# Patient Record
Sex: FEMALE | Race: WHITE | NOT HISPANIC OR LATINO
[De-identification: names, ages, dates, MRNs, and addresses within clinical notes are randomized per-mention and may not be internally consistent; named-entity substitution may affect disease eponyms.]

---

## 2022-09-01 PROBLEM — Z00.00 ENCOUNTER FOR PREVENTIVE HEALTH EXAMINATION: Status: ACTIVE | Noted: 2022-09-01

## 2022-09-21 ENCOUNTER — APPOINTMENT (OUTPATIENT)
Dept: VASCULAR SURGERY | Facility: CLINIC | Age: 82
End: 2022-09-21

## 2022-09-21 ENCOUNTER — NON-APPOINTMENT (OUTPATIENT)
Age: 82
End: 2022-09-21

## 2022-09-21 VITALS
DIASTOLIC BLOOD PRESSURE: 61 MMHG | HEART RATE: 71 BPM | WEIGHT: 132 LBS | SYSTOLIC BLOOD PRESSURE: 121 MMHG | BODY MASS INDEX: 21.99 KG/M2 | HEIGHT: 65 IN

## 2022-09-21 DIAGNOSIS — E78.5 HYPERLIPIDEMIA, UNSPECIFIED: ICD-10-CM

## 2022-09-21 DIAGNOSIS — I83.90 ASYMPTOMATIC VARICOSE VEINS OF UNSPECIFIED LOWER EXTREMITY: ICD-10-CM

## 2022-09-21 DIAGNOSIS — I78.1 NEVUS, NON-NEOPLASTIC: ICD-10-CM

## 2022-09-21 DIAGNOSIS — Z78.9 OTHER SPECIFIED HEALTH STATUS: ICD-10-CM

## 2022-09-21 PROCEDURE — 99203 OFFICE O/P NEW LOW 30 MIN: CPT

## 2022-09-21 PROCEDURE — 93970 EXTREMITY STUDY: CPT

## 2022-09-21 RX ORDER — PNV NO.95/FERROUS FUM/FOLIC AC 28MG-0.8MG
TABLET ORAL
Refills: 0 | Status: ACTIVE | COMMUNITY

## 2022-09-21 RX ORDER — MULTIVIT-MIN/FOLIC/VIT K/LYCOP 400-300MCG
1000 TABLET ORAL
Refills: 0 | Status: ACTIVE | COMMUNITY

## 2022-09-21 RX ORDER — UBIDECARENONE/VIT E ACET 100MG-5
CAPSULE ORAL
Refills: 0 | Status: ACTIVE | COMMUNITY

## 2022-09-21 RX ORDER — CALCIUM CARBONATE/VITAMIN D3 600 MG-10
TABLET ORAL
Refills: 0 | Status: ACTIVE | COMMUNITY

## 2022-09-21 RX ORDER — ROSUVASTATIN CALCIUM 10 MG/1
10 TABLET, FILM COATED ORAL
Refills: 0 | Status: ACTIVE | COMMUNITY

## 2022-09-26 NOTE — ADDENDUM
[FreeTextEntry1] : I, Dr. Erlin Ghotra, personally performed the evaluation and management (E/M) services for this new patient.  That E/M includes conducting the initial examination, assessing all conditions, and establishing the plan of care.  Today, my ACP, Mita Mcnair NP, was here to observe my evaluation and management services for this patient to be followed going forward. \par \par The documentation for this encounter was entered by Yani Garcia acting as a scribe for Dr.Gary Ghotra.\par

## 2022-09-26 NOTE — CONSULT LETTER
[Dear  ___] : Dear  [unfilled], [FreeTextEntry2] : Malcolm Bear MD\par [address]\par [place] [id number] [FreeTextEntry1] : I saw Ms Jami Segundo for an evaluation of varicose veins. She reports long standing history of varicose and spider veins. She has had sclerotherapy in the past and possibly also vein phlebectomy.  The veins on the left leg have become larger and bothersome. Denies any swelling, ulcers or history of bleeding or clotting disorders.\par \par On exam she has large bulging veins down the left calf to dorsum of the left foot with positive Trendelenburg's sign indicative of greater saphenous vein reflux. Scattered spider veins on both thighs and calves, worse on left calf, ankle and foot.  There is good arterial perfusion to both feet.\par \par Venous duplex showed no evidence of thrombosis or reflux in the right greater saphenous vein.  The scan confirms that the left greater saphenous vein has reflux into the large bulging venous varices.  \par \par I recommend proceeding with ablation of the incompetent left greater saphenous vein.  We discussed that stab phlebectomy may be a possibility following this if the varices persist but most patients do not require it.  We will perform this under local anesthesia in the office and she will be immediately able to walk out with only Ace bandages on the leg.  She wants to proceed and we will arrange for it to be done in our office. \par \par My complete EMR office note is below for your records.  [FreeTextEntry3] : Sincerely, \par \par Erlin Ghotra M.D. \par , Surgical Services [facility]\par , Department of Surgery [facility]\par Professor of Surgery, [employer] at [facility]

## 2022-09-26 NOTE — ASSESSMENT
[FreeTextEntry1] : 81 y/o F w/ LLE GSV reflux, large bulging vv, and spider veins. On exam, large bulging veins down the left calf to dorsum of the left foot. +Trendeleburg's sign. Scattered spider veins on both thighs and calves, worse on left calf, ankle and foot. Venous duplex showed no evidence of DVT/SVT bilaterally. RLE GSV had no reflux. LLE GSV positive reflux and vv noted, measuring up to 7.0mm. \par \par Discussed with pt the veins are not life threatening. I recommend ablation of the L incompetent greater saphenous vein.She might need subsequent stab phlebectomy if the varices persist. She wants to proceed and we will arrange for it to be done in our office. Risk, complications and alternatives were discussed, all questions were answered.

## 2022-09-26 NOTE — HISTORY OF PRESENT ILLNESS
[FreeTextEntry1] : 83 y/o F referred by PT Jenni Damian (who is also another pt of ours). She has a PMHx of HLD, varicose veins and remote sclerotherapy and [place] She presents for varicose vein evaluation. She describes large bulging varicose veins down her L calf to the top of her foot. She explains over time the veins have become larger and larger. They are not painful or bothersome but do itch at times. She finds them also visually disturbing. She would like to discuss possible treatment to remove them. Denies any leg swelling, back pain, rest pain, leg trauma, or skin changes. SHe also points to spider veins throughout both legs.\par \par \par Pt lives in [place]. \par \par SHx:\par -  for family in [place], kids all grown and now works with the mother only. \par - Stays active \par - Never smoker\par \par FHx:\par -Father: hx of vv

## 2022-09-26 NOTE — PROCEDURE
[FreeTextEntry1] : Venous duplex ordered to eval veins and r/o reflux, shows: negative DVT/SVTbilaterally. RLE: No reflux. LLE: GSV reflux and vv noted, measuring up to 7.0mm.

## 2022-09-26 NOTE — PHYSICAL EXAM
[Normal Rate and Rhythm] : normal rate and rhythm [2+] : left 2+ [JVD] : no jugular venous distention  [Ankle Swelling (On Exam)] : not present [] : not present [de-identified] : WN/WD [de-identified] : NC/AT [de-identified] : Supple [FreeTextEntry1] : Large bulging veins down the left calf to dorsum of the left foot. +Trendeleburg's sign. Scattered spider veins on both thighs and calves, worse on left calf, ankle and foot. [de-identified] : FROM

## 2022-10-07 ENCOUNTER — APPOINTMENT (OUTPATIENT)
Dept: VASCULAR SURGERY | Facility: CLINIC | Age: 82
End: 2022-10-07

## 2022-10-07 DIAGNOSIS — I87.2 VENOUS INSUFFICIENCY (CHRONIC) (PERIPHERAL): ICD-10-CM

## 2022-10-07 PROCEDURE — 36475 ENDOVENOUS RF 1ST VEIN: CPT | Mod: LT

## 2022-10-07 NOTE — ADDENDUM
[FreeTextEntry1] : This note was written by Mita Mcnair NP acting as scribe for Dr.Gary Brandin ALTMAN\par \par I, Dr. Erlin Ghotra  have read and attest that all the information, medical decision making and discharge instructions within are true and accurate.\par

## 2022-10-07 NOTE — PROCEDURE
[FreeTextEntry3] : Surgeon: Erlin Ghotra MD\par \par Assistant: Mita You RVT\par \par Pre-Op Diagnosis:  Incompetent LEFT Greater Saphenous Vein\par \par Post-Op Diagnosis:  Same\par \par Procedure:  Transcatheter Occlusion of LEFT Greater Saphenous Vein using a Radio- Frequency Thermal Ablation (VNUS) ClosureFAST Catheter.\par \par Anesthesia: Local \par \par History: Symptomatic varicose veins\par \par Findings:  Complete occlusion of greater saphenous vein at end of procedure.\par \par Procedure: The patient’s leg was prepped and draped.  Under local 1% Lidocaine the greater saphenous vein was punctured below the knee with a micropuncture needle and then the guidewire was inserted into the vein.  This was performed under ultrasound guidance.  The skin was incised with a #11 Blade, a dilator was inserted and then the 7 Fr. Introducer was placed into the greater saphenous vein.  \par \par The fossa ovalis was visualized with the Duplex scan.  The common femoral vein was confirmed to be patent.  Duplex examination of the greater saphenous vein from the calf to the groin was performed.  The greater saphenous and inferior epigastric vein were identified and the VNUS catheter was introduced through the introducer and into the vein up to the fossa ovalis.  It was positioned 3 cm distal to the inferior epigastric vein.\par \par Tumescent solution (400 cc normal saline, 4cc of 8.4% Sodium bicarbonate and 40 cc 1% Lidoaine) was infiltrated subfascially over the vein.  \par \par The VNUS ClosureFAST catheter checked for proper function.  Thermal ablation was begun after the position of the catheter was once again confirmed to be 3 cm distal to the inferior epigastric branch of the greater saphenous vein. The proximal 7 cm was treated twice and then the rest of the vein was treated with single 20 sec cycles. The sheath and catheter were removed. Compression was applied for hemostasis.    \par \par Confirmation of common femoral vein patency and occlusion of the greater saphenous vein was made with duplex ultrasonography.  \par \par The leg was wrapped with gauze and Ace Bandages.  The patient remained on the table until alert and was then comfortable ambulating.           \par \par Patient will return in 2-3 days for repeat ultrasound to make sure GSV is closed and to check for heat induced thrombus.\par

## 2022-10-11 ENCOUNTER — APPOINTMENT (OUTPATIENT)
Dept: VASCULAR SURGERY | Facility: CLINIC | Age: 82
End: 2022-10-11

## 2022-10-11 PROCEDURE — 93971 EXTREMITY STUDY: CPT

## 2022-11-09 ENCOUNTER — APPOINTMENT (OUTPATIENT)
Dept: VASCULAR SURGERY | Facility: CLINIC | Age: 82
End: 2022-11-09

## 2022-11-09 VITALS
HEIGHT: 65 IN | DIASTOLIC BLOOD PRESSURE: 57 MMHG | BODY MASS INDEX: 21.99 KG/M2 | SYSTOLIC BLOOD PRESSURE: 115 MMHG | WEIGHT: 132 LBS | HEART RATE: 56 BPM

## 2022-11-09 PROCEDURE — 99213 OFFICE O/P EST LOW 20 MIN: CPT

## 2022-11-09 PROCEDURE — 93971 EXTREMITY STUDY: CPT

## 2022-11-10 RX ORDER — FLUTICASONE PROPIONATE 50 UG/1
50 SPRAY, METERED NASAL
Qty: 16 | Refills: 0 | Status: ACTIVE | COMMUNITY
Start: 2021-10-25

## 2022-11-10 RX ORDER — COVID-19 ANTIGEN TEST
KIT MISCELLANEOUS
Qty: 8 | Refills: 0 | Status: ACTIVE | COMMUNITY
Start: 2022-07-05

## 2022-11-10 RX ORDER — CONJUGATED ESTROGENS 0.62 MG/G
0.62 CREAM VAGINAL
Qty: 30 | Refills: 0 | Status: ACTIVE | COMMUNITY
Start: 2022-11-02

## 2022-11-14 NOTE — HISTORY OF PRESENT ILLNESS
[FreeTextEntry1] : 83 y/o F referred by PT Jenni Damian (who is also another pt of ours). She has a PMHx of HLD, varicose veins and remote sclerotherapy and [place]le[place] ([place], [place]rs ago). She presented 9/21/22 with signs of LLE GSV reflux, and large, symptomatic, bulging vv, and spider veins. On 10/7/22 she underwent L GSV. \par \par Today, she presents for 1 month visit after L RFA. She is doing well. She reports some sensitivity over L leg, otherwise swelling has reduced. She still notes visually disturbing, and bothersome varicosities over her L foot and ankle and would like to discuss possible treatment. She points out scattered spider veins on the front of her L leg and bulging veins on the R calf that are only visually disturbing. Denies any wounds, signs of phlebitis, leg trauma, or skin changes. \par \par \par Pt lives in [place]. \par \par SHx:\par -  for family in [place], kids all grown and now works with the mother only. \par - Stays active \par - Never smoker\par \par FHx:\par -Father: hx of vv

## 2022-11-14 NOTE — PROCEDURE
[FreeTextEntry1] : LLE Venous US ordered today to eval GSV reveals:  negative DVT/SVT. L GSV closed s/p RFA 1 month ago. Varicosities noted at the L ankle. \par

## 2022-11-14 NOTE — ASSESSMENT
[Arterial/Venous Disease] : arterial/venous disease [FreeTextEntry1] : 81 y/o F w/ LLE GSV reflux s/p L RFA 10/7/22 with persistent, symptomatic LLE varicose veins, and bilt scattered spider veins. \par On exam, bulging vv worse on L distal calf, ankle and foot. Scattered spider veins on both calves. No edema or skin changes or wounds. \par LLE Venous US  to eval GSV reveals negative DVT/SVT. L GSV closed s/p RFA 1 month ago. Varicosities noted at the ankle. \par We discussed in office stab phlebectomy of L ankle and foot varicosities. Risk, complications, use of local anesthetic and alternatives were discussed, all questions were answered. Patient would like to proceed with procedure after the holidays. We will schedule this for her in the near future. Procedure will be completed in the office. \par

## 2022-11-14 NOTE — PHYSICAL EXAM
[Respiratory Effort] : normal respiratory effort [Normal Rate and Rhythm] : normal rate and rhythm [2+] : left 2+ [Varicose Veins Of Lower Extremities] : present [Varicose Veins Of The Left Leg] : of the left leg [No Rash or Lesion] : No rash or lesion [Alert] : alert [Oriented to Person] : oriented to person [Oriented to Place] : oriented to place [Oriented to Time] : oriented to time [Calm] : calm [JVD] : no jugular venous distention  [Ankle Swelling (On Exam)] : not present [Ankle Swelling On The Left] : moderate [] : not present [de-identified] : WN/WD [de-identified] : Supple [de-identified] : NC/AT [FreeTextEntry1] : Bulging vv worse on L distal/medial calf, ankle and foot.  Scattered spider veins on both calves.  [de-identified] : FROM

## 2022-11-14 NOTE — ADDENDUM
[FreeTextEntry1] : I, Dr. Erlin Ghotra, personally performed the evaluation and management (E/M) services for this established patient who presents today with (a) new problem(s)/exacerbation of (an) existing condition(s).  That E/M includes conducting the examination, assessing all new/exacerbated conditions, and establishing a new plan of care.  Today, my ACP, Mita Mcnair NP, was here to observe my evaluation and management services for this new problem/exacerbated condition to be followed going forward. \par \par The documentation for this encounter was entered by Yani Garcia acting as a scribe for Dr.Gary Ghotar.\par

## 2022-12-02 ENCOUNTER — APPOINTMENT (OUTPATIENT)
Dept: VASCULAR SURGERY | Facility: CLINIC | Age: 82
End: 2022-12-02

## 2022-12-02 PROCEDURE — 37765 STAB PHLEB VEINS XTR 10-20: CPT | Mod: LT

## 2022-12-02 NOTE — ADDENDUM
[FreeTextEntry1] : This note was written by Mita Mcnair NP acting as scribe for Dr.Gary Brandin BRADLEY.\par \par I, Dr. Erlin Ghotra  have read and attest that all the information, medical decision making and discharge instructions within are true and accurate.

## 2022-12-07 ENCOUNTER — APPOINTMENT (OUTPATIENT)
Dept: VASCULAR SURGERY | Facility: CLINIC | Age: 82
End: 2022-12-07

## 2022-12-07 VITALS
WEIGHT: 132 LBS | BODY MASS INDEX: 21.99 KG/M2 | HEIGHT: 65 IN | DIASTOLIC BLOOD PRESSURE: 62 MMHG | HEART RATE: 60 BPM | SYSTOLIC BLOOD PRESSURE: 120 MMHG

## 2022-12-07 DIAGNOSIS — I83.892 VARICOSE VEINS OF LEFT LOWER EXTREMITY WITH OTHER COMPLICATIONS: ICD-10-CM

## 2022-12-07 PROCEDURE — 99024 POSTOP FOLLOW-UP VISIT: CPT

## 2022-12-08 NOTE — HISTORY OF PRESENT ILLNESS
[FreeTextEntry1] : 83 y/o F referred by PT Jenni Damian (who is also another pt of ours). She has a PMHx of HLD, varicose veins and remote sclerotherapy and [place]). She presented 9/21/22 with signs of LLE GSV reflux, and large, symptomatic, bulging vv, and spider veins. On 10/7/22 she underwent L GSV and on 12/2/22, she underwent stab phlebectomy in the office for persistent symptomatic varices. \par \par Today, she presents for followup and to remove the stitches. She is doing well. She reports some sensitivity over L foot and bruising extending down tot he toes but mentions it has improved. She has been cleaning the incisions with peroxide daily after showers, elevating the leg and using compression wrap. Denies any wounds, signs of phlebitis, leg trauma, or skin changes. \par \par \par Pt lives in [place]. \par \par SHx:\par -  for family in [place], kids all grown and now works with the mother only. \par - Stays active \par - Never smoker\par \par FHx:\par -Father: hx of vv

## 2022-12-08 NOTE — ADDENDUM
[FreeTextEntry1] : I, Dr. Erlin Ghotra, personally performed the evaluation and management (E/M) services for this established patient who presents today with (a) new problem(s)/exacerbation of (an) existing condition(s).  That E/M includes conducting the examination, assessing all new/exacerbated conditions, and establishing a new plan of care.  Today, my ACP, Mita Mcnair NP, was here to observe my evaluation and management services for this new problem/exacerbated condition to be followed going forward. \par \par \par The documentation for this encounter was entered by Yani Garcia acting as a scribe for Dr.Gary Ghotra.\par

## 2022-12-08 NOTE — PHYSICAL EXAM
[JVD] : no jugular venous distention  [Ankle Swelling (On Exam)] : not present [Varicose Veins Of Lower Extremities] : not present [] : not present [de-identified] : WN/WD [de-identified] : NC/AT [de-identified] : Supple [FreeTextEntry1] : L ankle/foot incisions healing with sutures in place, no drainage and no signs of infection, distal foot mild swelling and moderate ecchymosis. Scattered spider veins on both calves.  [de-identified] : FROM [de-identified] : See cardiovascular section

## 2022-12-08 NOTE — ASSESSMENT
[FreeTextEntry1] : 81 y/o F w/ LLE GSV reflux s/p L RFA 10/7/22 with persistent, symptomatic LLE varicose veins, and bilt scattered spider veins. Now s/p L ankle/foot stab phlebectomy in the office 12/2/22.\par \par On exam, L ankle/foot incisions healing with sutures in place, no drainage and no signs of infection, distal foot mild swelling and moderate ecchymosis. Scattered spider veins on both calves. \par \par Pt to continue to clean w/ soap/water daily and allow steri strips to dry and fall off on their own or remove after 7 days. She may use compression wrap for support and to help decrease the edema. Elevate leg while resting and reassured her the ecchymosis will go away with time. She will f/u prn. She knows to call with any questions/concerns.

## 2023-02-01 ENCOUNTER — APPOINTMENT (OUTPATIENT)
Dept: VASCULAR SURGERY | Facility: CLINIC | Age: 83
End: 2023-02-01
Payer: MEDICARE

## 2023-02-01 VITALS
BODY MASS INDEX: 21.99 KG/M2 | SYSTOLIC BLOOD PRESSURE: 119 MMHG | WEIGHT: 132 LBS | HEIGHT: 65 IN | HEART RATE: 84 BPM | DIASTOLIC BLOOD PRESSURE: 65 MMHG

## 2023-02-01 PROCEDURE — 99213 OFFICE O/P EST LOW 20 MIN: CPT

## 2023-02-02 NOTE — HISTORY OF PRESENT ILLNESS
[FreeTextEntry1] : 83 y/o F referred by PT Jenni Damian (who is also another pt of ours). She has a PMHx of HLD, varicose veins and remote sclerotherapy and [place]). She presented 9/21/22 with signs of LLE GSV reflux, and large, symptomatic, bulging vv, and spider veins. On 10/7/22 she underwent L GSV and on 12/2/22, she underwent stab phlebectomy in the office for persistent symptomatic varices. \par \par Today, she presents for followup and reports still some discomfort on the top of the L foot and a small lump. She says it bothers her when she uses shoes and she is concerned there is something wrong. She also points out another vein now on the right leg which she thinks it large, bulging more and more prominent. She would like to know if any injections can be done to improve the appearance of the vein. Denies any itchiness, burning, achiness, over the veins, or any skin breakdown. \par \par Pt lives in [place]. \par \par SHx:\par -  for family in [place], kids all grown and now works with the mother only. \par - Stays active \par - Never smoker\par \par FHx:\par -Father: hx of vv

## 2023-02-02 NOTE — PHYSICAL EXAM
[Respiratory Effort] : normal respiratory effort [Normal Rate and Rhythm] : normal rate and rhythm [2+] : left 2+ [Alert] : alert [Oriented to Person] : oriented to person [Oriented to Place] : oriented to place [Oriented to Time] : oriented to time [Calm] : calm [JVD] : no jugular venous distention  [Ankle Swelling (On Exam)] : not present [Varicose Veins Of Lower Extremities] : bilaterally [Ankle Swelling On The Right] : mild [] : not present [de-identified] : WN/WD [de-identified] : NC/AT [de-identified] : Supple [FreeTextEntry1] : L ankle/foot incisions well healed, distal foot with very mild swelling. Possible suture nodule over the L foot. Scattered spider veins on both calves.  [de-identified] : FROM [de-identified] : See cardiovascular section

## 2023-02-02 NOTE — ADDENDUM
[FreeTextEntry1] : I, Dr. Erlin Ghotra, personally performed the evaluation and management (E/M) services for this established patient who presents today with (a) new problem(s)/exacerbation of (an) existing condition(s).  That E/M includes conducting the examination, assessing all new/exacerbated conditions, and establishing a new plan of care.  Today, my ACP, Mita Mcnair NP, was here to observe my evaluation and management services for this new problem/exacerbated condition to be followed going forward. \par \par The documentation for this encounter was entered by Yani Garcia acting as a scribe for Dr.Gary Ghotra.\par \par

## 2023-02-02 NOTE — ASSESSMENT
[Arterial/Venous Disease] : arterial/venous disease [FreeTextEntry1] : 83 y/o F w/ LLE GSV reflux s/p L RFA 10/7/22 with persistent, symptomatic LLE varicose veins, and bilt scattered spider veins. Now s/p L ankle/foot stab phlebectomy in the office 12/2/22.\par On exam, L ankle/foot incisions well healed, distal foot with very mild swelling. Possible suture nodule over the L foot. Scattered spider veins on both calves. \par Patient advised to apply warm compress/soak over the suture node, and reassured area will heal over time. THe suture is a dissolvable suture. No procedures recommended for the veins on the right leg. She can FU in 6 weeks to monitor progression.

## 2024-03-19 NOTE — PROCEDURE
[FreeTextEntry3] : Surgeon: Erlin Ghotra M.D.\par \par Pre-Op Diagnosis:  Bulging symptomatic varicose veins in the LEFT leg\par \par Post-Op Diagnosis:  Same\par \par Procedure:  Stab avulsion excision of numerous venous varices from LEFT leg, with 10-20 incisions.  \par \par Anesthesia: Local \par \par Procedure: The varices were marked and then the patient’s leg was prepped and draped in a sterile fashion. 1 % Lidocaine was infiltrated under the skin around the venous varies.  Using the stab avulsion technique with a #11 blade, the multiple varices were individually excised with fine mosquito clamps.  After the veins were excised the wounds were washed with hydrogen peroxide and closed with 6-0 nylon sutures.  Patient tolerated the procedure well.\par \par The leg was then washed and dried and covered with Kerlix rolled gauze and layers of Ace bandages. Aftercare instructions were given to patient, FU in 5-7 days for suture removal.  \par 
Lester Godfrey